# Patient Record
Sex: FEMALE | Race: BLACK OR AFRICAN AMERICAN | NOT HISPANIC OR LATINO | Employment: UNEMPLOYED | URBAN - METROPOLITAN AREA
[De-identification: names, ages, dates, MRNs, and addresses within clinical notes are randomized per-mention and may not be internally consistent; named-entity substitution may affect disease eponyms.]

---

## 2023-08-28 ENCOUNTER — OFFICE VISIT (OUTPATIENT)
Dept: PRIMARY CARE | Facility: CLINIC | Age: 43
End: 2023-08-28
Payer: COMMERCIAL

## 2023-08-28 VITALS
OXYGEN SATURATION: 98 % | SYSTOLIC BLOOD PRESSURE: 178 MMHG | HEIGHT: 66 IN | RESPIRATION RATE: 17 BRPM | WEIGHT: 229 LBS | HEART RATE: 100 BPM | BODY MASS INDEX: 36.8 KG/M2 | TEMPERATURE: 98.4 F | DIASTOLIC BLOOD PRESSURE: 103 MMHG

## 2023-08-28 DIAGNOSIS — Z00.00 HEALTH CARE MAINTENANCE: ICD-10-CM

## 2023-08-28 DIAGNOSIS — I10 UNCONTROLLED HYPERTENSION: Primary | ICD-10-CM

## 2023-08-28 DIAGNOSIS — G47.33 OBSTRUCTIVE SLEEP APNEA: ICD-10-CM

## 2023-08-28 DIAGNOSIS — Z83.3 FAMILY HISTORY OF DIABETES MELLITUS: ICD-10-CM

## 2023-08-28 PROCEDURE — 3077F SYST BP >= 140 MM HG: CPT | Performed by: STUDENT IN AN ORGANIZED HEALTH CARE EDUCATION/TRAINING PROGRAM

## 2023-08-28 PROCEDURE — 99204 OFFICE O/P NEW MOD 45 MIN: CPT | Performed by: STUDENT IN AN ORGANIZED HEALTH CARE EDUCATION/TRAINING PROGRAM

## 2023-08-28 PROCEDURE — 3080F DIAST BP >= 90 MM HG: CPT | Performed by: STUDENT IN AN ORGANIZED HEALTH CARE EDUCATION/TRAINING PROGRAM

## 2023-08-28 PROCEDURE — 93000 ELECTROCARDIOGRAM COMPLETE: CPT | Performed by: STUDENT IN AN ORGANIZED HEALTH CARE EDUCATION/TRAINING PROGRAM

## 2023-08-28 RX ORDER — LOSARTAN POTASSIUM 50 MG/1
100 TABLET ORAL DAILY
Qty: 60 TABLET | Refills: 0 | Status: SHIPPED | OUTPATIENT
Start: 2023-08-28 | End: 2023-11-13 | Stop reason: WASHOUT

## 2023-08-28 ASSESSMENT — ENCOUNTER SYMPTOMS
VOMITING: 0
HYPERTENSION: 1
WHEEZING: 0
WEAKNESS: 0
FEVER: 0
NAUSEA: 0
DEPRESSION: 0
DYSURIA: 0
NUMBNESS: 0
SPEECH DIFFICULTY: 0
COUGH: 0
SHORTNESS OF BREATH: 0
HEMATURIA: 0
DIZZINESS: 0
CONSTIPATION: 0
ABDOMINAL PAIN: 0
ACTIVITY CHANGE: 0

## 2023-08-28 NOTE — PROGRESS NOTES
Subjective   Patient ID: Jennifer Pete is a 43 y.o. female who presents for Establish Care and Hypertension.  Hypertension  Pertinent negatives include no chest pain or shortness of breath.     Ms. Pete is 43 years old here to establish care.  She has a medical history for hypertension for which she has been on hydrochlorothiazide 25 mg.  Reports to be compliant with medication  Her blood pressure was elevated up to 180s systolic on vital intake.  Repeat blood pressure was 178/103.  She denies any symptoms pertaining to cardiovascular system including headache, nausea, chest pain, dizziness, vision changes, shortness of breath, focal neurological deficit.    Past Medical History:   Diagnosis Date    34 weeks gestation of pregnancy 2022    34 weeks gestation of pregnancy    Causalgia of right upper limb 2021    Causalgia of right upper extremity    Other conditions influencing health status 05/10/2021    CRPS (complex regional pain syndrome)    Pain disorder with related psychological factors 10/30/2019    Pain disorder associated with psychological and physical factors    Pain in unspecified hand 10/01/2020    Hand pain    Personal history of other complications of pregnancy, childbirth and the puerperium 2016    History of spontaneous     Spontaneous rupture of extensor tendons, left hand 2021    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   No family history on file.   Allergies   Allergen Reactions    Iodinated Contrast Media Other    Lisinopril Cough, Swelling and Other    Other Itching and Other     Codiene    Shellfish Derived Other    Codeine Itching, Rash and Other          Occupation:     Review of Systems   Constitutional:  Negative for activity change and fever.   HENT:  Negative for congestion.    Respiratory:  Negative for cough, shortness of breath and wheezing.    Cardiovascular:  Negative  "for chest pain and leg swelling.   Gastrointestinal:  Negative for abdominal pain, constipation, nausea and vomiting.   Endocrine: Negative for cold intolerance.   Genitourinary:  Negative for dysuria, hematuria and urgency.   Neurological:  Negative for dizziness, speech difficulty, weakness and numbness.   Psychiatric/Behavioral:  Negative for self-injury and suicidal ideas.        Objective   Visit Vitals  BP (!) 178/103   Pulse 100   Temp 36.9 °C (98.4 °F)   Resp 17   Ht 1.676 m (5' 6\")   Wt 104 kg (229 lb)   SpO2 98%   BMI 36.96 kg/m²   BSA 2.2 m²      Physical Exam  HENT:      Head: Normocephalic and atraumatic.      Right Ear: Tympanic membrane normal.      Left Ear: Tympanic membrane normal.      Nose: Nose normal.      Mouth/Throat:      Mouth: Mucous membranes are moist.   Eyes:      Extraocular Movements: Extraocular movements intact.      Conjunctiva/sclera: Conjunctivae normal.      Pupils: Pupils are equal, round, and reactive to light.   Cardiovascular:      Rate and Rhythm: Normal rate and regular rhythm.      Pulses: Normal pulses.      Heart sounds: Normal heart sounds.   Pulmonary:      Effort: Pulmonary effort is normal.      Breath sounds: Normal breath sounds. No stridor. No rhonchi.   Musculoskeletal:      Cervical back: Neck supple.   Neurological:      General: No focal deficit present.      Mental Status: She is oriented to person, place, and time.      Cranial Nerves: No cranial nerve deficit.      Sensory: No sensory deficit.      Motor: No weakness.      Coordination: Coordination normal.      Gait: Gait normal.      Deep Tendon Reflexes: Reflexes normal.   Psychiatric:         Mood and Affect: Mood normal.         Behavior: Behavior normal.         Assessment/Plan     Problem List Items Addressed This Visit    None  Visit Diagnoses       Uncontrolled hypertension    -  Primary    Relevant Medications    losartan (Cozaar) 50 mg tablet    Other Relevant Orders    Lipid Panel    " Comprehensive Metabolic Panel    CBC    TSH with reflex to Free T4 if abnormal    ECG 12 lead (Completed)    Family history of diabetes mellitus        Relevant Orders    Hemoglobin A1C    Health care maintenance        Relevant Orders    Hemoglobin A1C    Obstructive sleep apnea        Relevant Orders    In-Center Sleep Study (Non-Sleep Provider Only)    Referral to Adult Sleep Medicine        Uncontrolled hypertension  In office EKG shows normal sinus rhythm with sinus tachycardia and no acute ST-T wave changes.  Patient denies any symptoms pertaining to cardiovascular system.  Neurological examination is within normal limits  Patient has a history of diagnosed sleep apnea 10 to 15 years ago, was given his CPAP but reports to be noncompliant  Repeat testing along with sleep medicine referral placed  We will get some blood work including A1c and lipid panel given the family history of diabetes and elevated cholesterol.  Once results are obtained we will call the patient with abnormal results of any and discuss further evaluation and management.  Advised patient to continue hydrochlorothiazide 25 mg daily along with losartan 100 mg  Patient to seek medical attention immediately or call 911 if she develops alarm symptoms and signs including chest pain shortness of breath or focal neurological deficit.  Verbalizes understanding.  Patient to see me in 3 to 4 days for a brief blood pressure check.

## 2023-10-06 ENCOUNTER — HOSPITAL ENCOUNTER (EMERGENCY)
Facility: HOSPITAL | Age: 43
Discharge: HOME | End: 2023-10-06
Payer: COMMERCIAL

## 2023-10-06 VITALS
DIASTOLIC BLOOD PRESSURE: 99 MMHG | SYSTOLIC BLOOD PRESSURE: 180 MMHG | RESPIRATION RATE: 16 BRPM | HEART RATE: 102 BPM | HEIGHT: 66 IN | OXYGEN SATURATION: 99 % | TEMPERATURE: 97.7 F | BODY MASS INDEX: 34.72 KG/M2 | WEIGHT: 216 LBS

## 2023-10-06 DIAGNOSIS — T63.444A BEE STING REACTION, UNDETERMINED INTENT, INITIAL ENCOUNTER: Primary | ICD-10-CM

## 2023-10-06 PROCEDURE — 99283 EMERGENCY DEPT VISIT LOW MDM: CPT

## 2023-10-06 PROCEDURE — 99283 EMERGENCY DEPT VISIT LOW MDM: CPT | Performed by: PHYSICIAN ASSISTANT

## 2023-10-06 RX ORDER — CAMPHOR 0.45 %
1 GEL (GRAM) TOPICAL 3 TIMES DAILY
Qty: 210 G | Refills: 0 | Status: SHIPPED | OUTPATIENT
Start: 2023-10-06 | End: 2023-11-13 | Stop reason: WASHOUT

## 2023-10-06 RX ORDER — DIPHENHYDRAMINE HCL 25 MG
50 TABLET ORAL EVERY 6 HOURS
Qty: 56 TABLET | Refills: 0 | Status: SHIPPED | OUTPATIENT
Start: 2023-10-06 | End: 2023-11-13 | Stop reason: WASHOUT

## 2023-10-06 RX ORDER — LORATADINE 10 MG/1
10 TABLET ORAL DAILY
Qty: 20 TABLET | Refills: 0 | Status: SHIPPED | OUTPATIENT
Start: 2023-10-06 | End: 2023-10-26

## 2023-10-06 ASSESSMENT — COLUMBIA-SUICIDE SEVERITY RATING SCALE - C-SSRS
1. IN THE PAST MONTH, HAVE YOU WISHED YOU WERE DEAD OR WISHED YOU COULD GO TO SLEEP AND NOT WAKE UP?: NO
6. HAVE YOU EVER DONE ANYTHING, STARTED TO DO ANYTHING, OR PREPARED TO DO ANYTHING TO END YOUR LIFE?: NO

## 2023-10-06 NOTE — ED PROVIDER NOTES
"This is a 43-year-old female with past medical history of hypertension who presents to the ED after a bee sting to her right hand that occurred 2 days ago.  She states initially she was concerned the stinger was stuck in her hand so her significant other tried to pull it out with some tweezers, however was unsure if they were able to remove the stinger and she now has a very small wound present scab present to her hand where they tried to remove the stinger from.  She states that she has noticed it has been red and slightly swollen ever since the bee sting and was concerned that this was not improving.  She states the area is mildly pruritic.  She denies any rash although where anywhere else on her body.  She denies any lip or tongue swelling, sensation that her throat was closing, difficulty swallowing, shortness of breath, lightheadedness, palpitations or other symptoms at this time.  She states that she has not been stung by a bee in several years and does not remember her previous reaction.  She denies any history of anaphylactic reactions to bee stings.  She took ibuprofen at home for her symptoms.  She did not take any antihistamines.      History provided by:  Patient   used: No          Visit Vitals  BP (!) 180/99 (BP Location: Right arm)   Pulse 102   Temp 36.5 °C (97.7 °F)   Resp 16   Ht 1.676 m (5' 6\")   Wt 98 kg (216 lb)   SpO2 99%   BMI 34.86 kg/m²   BSA 2.14 m²          Physical Exam     Physical exam:   General: Vitals noted, no distress. Afebrile.   EENT: Hearing grossly intact. Normal phonation.  Posterior oropharynx gross unremarkable.any visible angioedema.  No angioedema to lips or tongue.  MMM. Airway patient. PERRL. EOMI. Neck: No midline tenderness or paraspinal tenderness. FROM.   Cardiac: Regular, rate, rhythm. Normal S1 and S2.  No murmurs, gallops, rubs.   Pulmonary: Good air exchange. Lungs clear bilaterally. No wheezes, rhonchi, rales. No accessory muscle use.  "   Back: No CVA tenderness. No midline tenderness or paraspinal tenderness. No obvious deformity.   Extremities: No peripheral edema.  Full range of motion. Moves all extremities freely.  Tenderness palpation to the right hand without any other areas of tenderness.    Skin: Small amount of soft tissue swelling with tenderness palpation over the dorsum of the right hand with a small scab present without any visible foreign bodies.  Mildly tender to touch.  No excess warmth to this area, however small amount of erythema.  No hives.  Warm and Dry.   Neuro: No focal neurologic deficits. CN 2-12 grossly intact. Sensation equal bilaterally. No weakness.         Labs Reviewed - No data to display    No orders to display         ED Course & MDM     Medical Decision Making  This is a 43-year-old female with past medical Struve hypertension who presents to the ED after she was stung by a bee 2 days ago with redness and swelling to her right hand.  Vital stable upon arrival to the ED vitals that show she was hypertensive at 180/99 upon arrival to the ED, however patient denied headache, chest pain or other symptoms of hypertensive emergency.  Patient was advised to call the primary care provider concerning this blood pressure and to continue taking her home blood pressure medications.  On examination of her hand there was a small scab present with surrounding erythema without any excess warmth.  The area was mildly tender to touch.  Exam is consistent with a localized histamine reaction.  Patient denied any systemic symptoms of infection and based on her description of her symptoms I have low suspicion for cellulitis at this time.  Patient was advised to take antihistamines at home as needed for this and was given prescriptions for Claritin, Benadryl, and topical Benadryl cream for her symptoms.  She was advised to follow with her primary care provider if her symptoms persist.  She was given signs symptoms to return to the ED  with and was discharged from emergency department stable condition.    Risk  Prescription drug management.           Procedures    TOM Alfredo, PAT Atkins PA-C  10/06/23 1204

## 2023-11-09 PROBLEM — J45.909 ASTHMA (HHS-HCC): Status: ACTIVE | Noted: 2019-10-25

## 2023-11-09 PROBLEM — F41.9 ANXIETY: Status: ACTIVE | Noted: 2023-11-09

## 2023-11-09 PROBLEM — G90.511 COMPLEX REGIONAL PAIN SYNDROME TYPE 1 OF RIGHT UPPER EXTREMITY: Status: ACTIVE | Noted: 2019-09-25

## 2023-11-09 PROBLEM — N92.1 MENOMETRORRHAGIA: Status: ACTIVE | Noted: 2020-04-02

## 2023-11-09 PROBLEM — D64.9 ANEMIA: Status: ACTIVE | Noted: 2023-11-09

## 2023-11-09 PROBLEM — N93.9 ABNORMAL UTERINE BLEEDING (AUB): Status: ACTIVE | Noted: 2020-09-28

## 2023-11-09 PROBLEM — O09.90 SUPERVISION OF HIGH-RISK PREGNANCY (HHS-HCC): Status: ACTIVE | Noted: 2023-11-09

## 2023-11-09 PROBLEM — I10 ESSENTIAL HYPERTENSION: Status: ACTIVE | Noted: 2019-10-25

## 2023-11-09 PROBLEM — E61.1 IRON DEFICIENCY: Status: ACTIVE | Noted: 2022-02-22

## 2023-11-09 PROBLEM — G47.30 SLEEP APNEA: Status: ACTIVE | Noted: 2023-11-09

## 2023-11-09 RX ORDER — CYANOCOBALAMIN (VITAMIN B-12) 1000MCG/15
1000 LIQUID (ML) ORAL
COMMUNITY
Start: 2022-04-01 | End: 2023-11-13 | Stop reason: WASHOUT

## 2023-11-09 RX ORDER — NAPROXEN SODIUM 220 MG/1
1 TABLET, FILM COATED ORAL DAILY
COMMUNITY
Start: 2022-03-31

## 2023-11-09 RX ORDER — MEDROXYPROGESTERONE ACETATE 150 MG/ML
INJECTION, SUSPENSION INTRAMUSCULAR
COMMUNITY
Start: 2022-06-14

## 2023-11-09 RX ORDER — FLUTICASONE FUROATE AND VILANTEROL 100; 25 UG/1; UG/1
1 POWDER RESPIRATORY (INHALATION)
COMMUNITY
Start: 2020-03-26

## 2023-11-09 RX ORDER — CYCLOBENZAPRINE HCL 5 MG
1 TABLET ORAL NIGHTLY
COMMUNITY
Start: 2022-08-05 | End: 2023-11-13 | Stop reason: WASHOUT

## 2023-11-09 RX ORDER — MONTELUKAST SODIUM 10 MG/1
10 TABLET ORAL
COMMUNITY
Start: 2022-02-02 | End: 2023-11-13 | Stop reason: WASHOUT

## 2023-11-09 RX ORDER — OMEPRAZOLE 20 MG/1
20 CAPSULE, DELAYED RELEASE ORAL
COMMUNITY
Start: 2022-02-18 | End: 2023-11-13 | Stop reason: WASHOUT

## 2023-11-09 RX ORDER — HYDROCHLOROTHIAZIDE 25 MG/1
1 TABLET ORAL DAILY
COMMUNITY
Start: 2022-08-05

## 2023-11-09 RX ORDER — ALBUTEROL SULFATE 90 UG/1
2 AEROSOL, METERED RESPIRATORY (INHALATION)
COMMUNITY
Start: 2022-03-31

## 2023-11-09 RX ORDER — AMLODIPINE BESYLATE 10 MG/1
TABLET ORAL
COMMUNITY
Start: 2022-06-14

## 2023-11-09 RX ORDER — ACETAMINOPHEN 325 MG/1
TABLET ORAL
COMMUNITY
Start: 2022-06-14 | End: 2023-11-13 | Stop reason: WASHOUT

## 2023-11-09 RX ORDER — TRIAMTERENE AND HYDROCHLOROTHIAZIDE 37.5; 25 MG/1; MG/1
1 CAPSULE ORAL
COMMUNITY
End: 2023-11-13 | Stop reason: WASHOUT

## 2023-11-09 RX ORDER — CETIRIZINE HYDROCHLORIDE 10 MG/1
1 TABLET ORAL NIGHTLY
COMMUNITY
Start: 2022-02-02

## 2023-11-09 RX ORDER — FERROUS SULFATE 325(65) MG
1 TABLET ORAL 2 TIMES DAILY
COMMUNITY
Start: 2022-03-31 | End: 2023-11-13 | Stop reason: WASHOUT

## 2023-11-09 RX ORDER — LABETALOL 200 MG/1
400 TABLET, FILM COATED ORAL 2 TIMES DAILY
COMMUNITY
Start: 2022-02-21 | End: 2023-11-13 | Stop reason: WASHOUT

## 2023-11-13 ENCOUNTER — CLINICAL SUPPORT (OUTPATIENT)
Dept: OBSTETRICS AND GYNECOLOGY | Facility: CLINIC | Age: 43
End: 2023-11-13
Payer: COMMERCIAL

## 2023-11-13 DIAGNOSIS — Z30.42 ENCOUNTER FOR DEPO-PROVERA CONTRACEPTION: Primary | ICD-10-CM

## 2023-11-13 PROCEDURE — 2500000004 HC RX 250 GENERAL PHARMACY W/ HCPCS (ALT 636 FOR OP/ED): Mod: SE | Performed by: STUDENT IN AN ORGANIZED HEALTH CARE EDUCATION/TRAINING PROGRAM

## 2023-11-13 PROCEDURE — 96372 THER/PROPH/DIAG INJ SC/IM: CPT | Performed by: STUDENT IN AN ORGANIZED HEALTH CARE EDUCATION/TRAINING PROGRAM

## 2023-11-13 RX ORDER — MEDROXYPROGESTERONE ACETATE 150 MG/ML
150 INJECTION, SUSPENSION INTRAMUSCULAR ONCE
Status: COMPLETED | OUTPATIENT
Start: 2023-11-13 | End: 2023-11-13

## 2023-11-13 RX ADMIN — MEDROXYPROGESTERONE ACETATE 150 MG: 150 INJECTION, SUSPENSION INTRAMUSCULAR at 11:47

## 2023-11-13 ASSESSMENT — PATIENT HEALTH QUESTIONNAIRE - PHQ9
2. FEELING DOWN, DEPRESSED OR HOPELESS: NOT AT ALL
1. LITTLE INTEREST OR PLEASURE IN DOING THINGS: NOT AT ALL
SUM OF ALL RESPONSES TO PHQ9 QUESTIONS 1 & 2: 0

## 2023-11-13 NOTE — PROGRESS NOTES
Patient here for Depoprovera, last injection 8/18/2023  Discussed side effects ( denies ), calcium- will take supplements.    Return to clinic card given for   1/29-2/19/2024    Annual exam due 8/2024    Depoprovera 150mg intramuscular given.  Tolerated well

## 2024-02-02 ENCOUNTER — CLINICAL SUPPORT (OUTPATIENT)
Dept: OBSTETRICS AND GYNECOLOGY | Facility: CLINIC | Age: 44
End: 2024-02-02
Payer: COMMERCIAL

## 2024-02-02 DIAGNOSIS — Z30.42 ENCOUNTER FOR DEPO-PROVERA CONTRACEPTION: Primary | ICD-10-CM

## 2024-02-02 PROCEDURE — 2500000004 HC RX 250 GENERAL PHARMACY W/ HCPCS (ALT 636 FOR OP/ED): Mod: SE | Performed by: OBSTETRICS & GYNECOLOGY

## 2024-02-02 PROCEDURE — 96372 THER/PROPH/DIAG INJ SC/IM: CPT | Performed by: OBSTETRICS & GYNECOLOGY

## 2024-02-02 RX ORDER — MEDROXYPROGESTERONE ACETATE 150 MG/ML
150 INJECTION, SUSPENSION INTRAMUSCULAR ONCE
Status: COMPLETED | OUTPATIENT
Start: 2024-02-02 | End: 2024-02-02

## 2024-02-02 RX ADMIN — MEDROXYPROGESTERONE ACETATE 150 MG: 150 INJECTION, SUSPENSION INTRAMUSCULAR at 14:11

## 2024-02-02 ASSESSMENT — PATIENT HEALTH QUESTIONNAIRE - PHQ9
SUM OF ALL RESPONSES TO PHQ9 QUESTIONS 1 & 2: 0
1. LITTLE INTEREST OR PLEASURE IN DOING THINGS: NOT AT ALL
2. FEELING DOWN, DEPRESSED OR HOPELESS: NOT AT ALL

## 2024-02-02 ASSESSMENT — SOCIAL DETERMINANTS OF HEALTH (SDOH)
WITHIN THE LAST YEAR, HAVE YOU BEEN KICKED, HIT, SLAPPED, OR OTHERWISE PHYSICALLY HURT BY YOUR PARTNER OR EX-PARTNER?: NO
WITHIN THE LAST YEAR, HAVE YOU BEEN AFRAID OF YOUR PARTNER OR EX-PARTNER?: NO
WITHIN THE LAST YEAR, HAVE YOU BEEN HUMILIATED OR EMOTIONALLY ABUSED IN OTHER WAYS BY YOUR PARTNER OR EX-PARTNER?: NO
WITHIN THE LAST YEAR, HAVE TO BEEN RAPED OR FORCED TO HAVE ANY KIND OF SEXUAL ACTIVITY BY YOUR PARTNER OR EX-PARTNER?: NO

## 2024-02-02 NOTE — PROGRESS NOTES
Patient here for Depoprovera, last injection 11/13/23  Discussed side effects (  denies), calcium.    Return to clinic card given for   4/19-5/10/2024    Annual exam due 8/2024    Depoprovera 150mg intramuscular given.  Tolerated well

## 2024-04-19 ENCOUNTER — CLINICAL SUPPORT (OUTPATIENT)
Dept: OBSTETRICS AND GYNECOLOGY | Facility: CLINIC | Age: 44
End: 2024-04-19
Payer: COMMERCIAL

## 2024-04-19 DIAGNOSIS — Z30.42 DEPO-PROVERA CONTRACEPTIVE STATUS: Primary | ICD-10-CM

## 2024-04-19 PROCEDURE — 2500000004 HC RX 250 GENERAL PHARMACY W/ HCPCS (ALT 636 FOR OP/ED): Mod: SE | Performed by: ADVANCED PRACTICE MIDWIFE

## 2024-04-19 PROCEDURE — 96372 THER/PROPH/DIAG INJ SC/IM: CPT | Performed by: ADVANCED PRACTICE MIDWIFE

## 2024-04-19 RX ORDER — MEDROXYPROGESTERONE ACETATE 150 MG/ML
150 INJECTION, SUSPENSION INTRAMUSCULAR ONCE
Status: COMPLETED | OUTPATIENT
Start: 2024-04-19 | End: 2024-04-19

## 2024-04-19 RX ADMIN — MEDROXYPROGESTERONE ACETATE 150 MG: 150 INJECTION, SUSPENSION INTRAMUSCULAR at 09:48

## 2024-04-19 NOTE — PROGRESS NOTES
Pt happy with depo. Discussed increasing calcium in diet, Next ape due in August. Next depo due 7/5-7/26/24

## 2024-08-07 ENCOUNTER — OFFICE VISIT (OUTPATIENT)
Dept: OBSTETRICS AND GYNECOLOGY | Facility: CLINIC | Age: 44
End: 2024-08-07
Payer: COMMERCIAL

## 2024-08-07 ENCOUNTER — PHARMACY VISIT (OUTPATIENT)
Dept: PHARMACY | Facility: CLINIC | Age: 44
End: 2024-08-07
Payer: MEDICAID

## 2024-08-07 VITALS
BODY MASS INDEX: 38.17 KG/M2 | SYSTOLIC BLOOD PRESSURE: 162 MMHG | HEIGHT: 66 IN | WEIGHT: 237.5 LBS | DIASTOLIC BLOOD PRESSURE: 115 MMHG

## 2024-08-07 DIAGNOSIS — I10 UNCONTROLLED HYPERTENSION: ICD-10-CM

## 2024-08-07 DIAGNOSIS — Z01.419 VISIT FOR GYNECOLOGIC EXAMINATION: Primary | ICD-10-CM

## 2024-08-07 DIAGNOSIS — L02.92 BOIL: ICD-10-CM

## 2024-08-07 DIAGNOSIS — F43.9 STRESS AT HOME: ICD-10-CM

## 2024-08-07 DIAGNOSIS — Z32.02 PREGNANCY TEST NEGATIVE: ICD-10-CM

## 2024-08-07 DIAGNOSIS — F41.9 ANXIETY: ICD-10-CM

## 2024-08-07 DIAGNOSIS — Z30.013 ENCOUNTER FOR INITIAL PRESCRIPTION OF INJECTABLE CONTRACEPTIVE: ICD-10-CM

## 2024-08-07 LAB — PREGNANCY TEST URINE, POC: NEGATIVE

## 2024-08-07 PROCEDURE — 87491 CHLMYD TRACH DNA AMP PROBE: CPT | Performed by: ADVANCED PRACTICE MIDWIFE

## 2024-08-07 PROCEDURE — 99396 PREV VISIT EST AGE 40-64: CPT | Performed by: ADVANCED PRACTICE MIDWIFE

## 2024-08-07 PROCEDURE — 81025 URINE PREGNANCY TEST: CPT | Performed by: ADVANCED PRACTICE MIDWIFE

## 2024-08-07 PROCEDURE — 96372 THER/PROPH/DIAG INJ SC/IM: CPT | Performed by: ADVANCED PRACTICE MIDWIFE

## 2024-08-07 PROCEDURE — 3077F SYST BP >= 140 MM HG: CPT | Performed by: ADVANCED PRACTICE MIDWIFE

## 2024-08-07 PROCEDURE — 3080F DIAST BP >= 90 MM HG: CPT | Performed by: ADVANCED PRACTICE MIDWIFE

## 2024-08-07 PROCEDURE — 3008F BODY MASS INDEX DOCD: CPT | Performed by: ADVANCED PRACTICE MIDWIFE

## 2024-08-07 PROCEDURE — RXMED WILLOW AMBULATORY MEDICATION CHARGE

## 2024-08-07 PROCEDURE — 2500000004 HC RX 250 GENERAL PHARMACY W/ HCPCS (ALT 636 FOR OP/ED): Mod: SE | Performed by: ADVANCED PRACTICE MIDWIFE

## 2024-08-07 PROCEDURE — 87661 TRICHOMONAS VAGINALIS AMPLIF: CPT | Performed by: ADVANCED PRACTICE MIDWIFE

## 2024-08-07 RX ORDER — NAPROXEN SODIUM 220 MG/1
81 TABLET, FILM COATED ORAL DAILY
Qty: 90 TABLET | Refills: 0 | Status: SHIPPED | OUTPATIENT
Start: 2024-08-07

## 2024-08-07 RX ORDER — MEDROXYPROGESTERONE ACETATE 150 MG/ML
150 INJECTION, SUSPENSION INTRAMUSCULAR
Status: SHIPPED | OUTPATIENT
Start: 2024-08-07

## 2024-08-07 RX ORDER — AMLODIPINE BESYLATE 10 MG/1
10 TABLET ORAL DAILY
Qty: 30 TABLET | Refills: 2 | Status: SHIPPED | OUTPATIENT
Start: 2024-08-07

## 2024-08-07 RX ORDER — HYDROCHLOROTHIAZIDE 25 MG/1
25 TABLET ORAL DAILY
Qty: 60 TABLET | Refills: 2 | Status: SHIPPED | OUTPATIENT
Start: 2024-08-07

## 2024-08-07 RX ORDER — SULFAMETHOXAZOLE AND TRIMETHOPRIM 800; 160 MG/1; MG/1
1 TABLET ORAL 2 TIMES DAILY
Qty: 6 TABLET | Refills: 0 | Status: SHIPPED | OUTPATIENT
Start: 2024-08-07 | End: 2024-08-10

## 2024-08-07 ASSESSMENT — ENCOUNTER SYMPTOMS
ALLERGIC/IMMUNOLOGIC NEGATIVE: 0
ENDOCRINE NEGATIVE: 0
RESPIRATORY NEGATIVE: 1
PSYCHIATRIC NEGATIVE: 0
RESPIRATORY NEGATIVE: 0
GASTROINTESTINAL NEGATIVE: 1
ALLERGIC/IMMUNOLOGIC NEGATIVE: 1
ENDOCRINE NEGATIVE: 1
NEUROLOGICAL NEGATIVE: 0
HEMATOLOGIC/LYMPHATIC NEGATIVE: 1
FATIGUE: 1
CARDIOVASCULAR NEGATIVE: 1
EYES NEGATIVE: 0
CARDIOVASCULAR NEGATIVE: 0
MUSCULOSKELETAL NEGATIVE: 0
PSYCHIATRIC NEGATIVE: 1
NEUROLOGICAL NEGATIVE: 1
EYES NEGATIVE: 1
CONSTITUTIONAL NEGATIVE: 0
MUSCULOSKELETAL NEGATIVE: 1
HEMATOLOGIC/LYMPHATIC NEGATIVE: 0
GASTROINTESTINAL NEGATIVE: 0

## 2024-08-07 ASSESSMENT — PATIENT HEALTH QUESTIONNAIRE - PHQ9
1. LITTLE INTEREST OR PLEASURE IN DOING THINGS: NOT AT ALL
SUM OF ALL RESPONSES TO PHQ9 QUESTIONS 1 AND 2: 0
2. FEELING DOWN, DEPRESSED OR HOPELESS: NOT AT ALL

## 2024-08-07 ASSESSMENT — PAIN SCALES - GENERAL: PAINLEVEL: 0-NO PAIN

## 2024-08-07 NOTE — PROGRESS NOTES
"Balwinder Pete is a 44 y.o. female who is here for a routine exam. none  Last depo April   Was due end of July    Current contraception: Depo-Provera injections  History of abnormal Pap smear: no  Family history of uterine or ovarian cancer: no  Regular self breast exam: yes  History of abnormal mammogram: no  Family history of breast cancer: no  History of abnormal lipids: no  Menstrual History:  OB History          4    Para   1    Term   1            AB   2    Living   2         SAB   1    IAB   1    Ectopic        Multiple        Live Births   1                Menarche age: 14  No LMP recorded. Patient has had an injection.       Review of Systems   Constitutional:  Positive for fatigue.   HENT: Negative.     Eyes: Negative.    Respiratory: Negative.     Cardiovascular: Negative.    Gastrointestinal: Negative.    Endocrine: Negative.    Genitourinary: Negative.    Musculoskeletal: Negative.    Skin: Negative.    Allergic/Immunologic: Negative.    Neurological: Negative.    Hematological: Negative.    Psychiatric/Behavioral: Negative.         Objective   BP (!) 162/115 Comment: MD aware  Ht 1.676 m (5' 6\")   Wt 108 kg (237 lb 8 oz)   BMI 38.33 kg/m²   Physical Exam  Vitals reviewed.   Constitutional:       General: She is not in acute distress.     Appearance: Normal appearance.   HENT:      Head: Normocephalic.   Eyes:      Pupils: Pupils are equal, round, and reactive to light.   Cardiovascular:      Rate and Rhythm: Normal rate and regular rhythm.      Heart sounds: No murmur heard.     No gallop.   Pulmonary:      Effort: Pulmonary effort is normal. No respiratory distress.      Breath sounds: Normal breath sounds. No stridor. No wheezing, rhonchi or rales.   Chest:      Chest wall: No tenderness or crepitus.   Breasts:     Right: No inverted nipple, mass, nipple discharge, skin change or tenderness.      Left: Normal. No inverted nipple, mass, nipple discharge, skin change or " tenderness.   Abdominal:      General: Abdomen is flat.      Palpations: Abdomen is soft. There is no mass.      Tenderness: There is no abdominal tenderness. There is no right CVA tenderness, left CVA tenderness or rebound.      Hernia: No hernia is present.   Genitourinary:     General: Normal vulva.      Pubic Area: No rash.       Labia:         Right: No rash, tenderness, lesion or injury.         Left: No rash, tenderness, lesion or injury.       Urethra: No prolapse or urethral swelling.      Vagina: Normal. No foreign body. No erythema, tenderness, bleeding, lesions or prolapsed vaginal walls.      Cervix: No cervical motion tenderness, friability or lesion.      Uterus: Normal. Not enlarged, not tender and no uterine prolapse.       Adnexa: Right adnexa normal and left adnexa normal.        Right: No mass or tenderness.          Left: No mass or tenderness.        Rectum: Normal.      Comments: EGBUS WNL   Musculoskeletal:      Cervical back: Neck supple. No rigidity or tenderness.   Skin:     General: Skin is warm and dry.   Neurological:      Mental Status: She is alert.   Psychiatric:         Mood and Affect: Mood normal.         Behavior: Behavior normal.         Thought Content: Thought content normal.         Judgment: Judgment normal.          Assessment/Plan   Problem List Items Addressed This Visit    None  Visit Diagnoses       Visit for gynecologic examination    -  Primary    Relevant Orders    CBC    THINPREP PAP TEST    Hemoglobin A1c    TSH with reflex to Free T4 if abnormal    BI mammo bilateral screening tomosynthesis    Pregnancy test negative        Relevant Orders    POCT pregnancy, urine manually resulted (Completed)    Uncontrolled hypertension        Relevant Medications    aspirin 81 mg chewable tablet    hydroCHLOROthiazide (HYDRODiuril) 25 mg tablet    amLODIPine (Norvasc) 10 mg tablet    Other Relevant Orders    Referral to Primary Care    Comprehensive Metabolic Panel    Encounter  for initial prescription of injectable contraceptive        Relevant Orders    Hepatitis C Antibody    HIV-1 and HIV-2 antibodies    Syphilis Screen with Reflex           Lengthy discussion with patient regarding severe range BP  Recheck was 181/125 as patient became more stressed thinking about complex social situation- recommended patient go to ED via ambulance immediately- patient declines    Risks of stroke, paralysis, morbidity and mortality reviewed    Patient is to follow up with new PCP ASAP recs given  Rx for amlodipine added -side effects reviewed      Radha ALMEIDA CNM

## 2024-08-08 ENCOUNTER — APPOINTMENT (OUTPATIENT)
Dept: OBSTETRICS AND GYNECOLOGY | Facility: CLINIC | Age: 44
End: 2024-08-08
Payer: COMMERCIAL

## 2024-08-08 LAB
C TRACH RRNA SPEC QL NAA+PROBE: NEGATIVE
N GONORRHOEA DNA SPEC QL PROBE+SIG AMP: NEGATIVE
T VAGINALIS RRNA SPEC QL NAA+PROBE: NEGATIVE

## 2024-08-22 LAB
CYTOLOGY CMNT CVX/VAG CYTO-IMP: NORMAL
HPV HR 12 DNA GENITAL QL NAA+PROBE: NEGATIVE
HPV HR GENOTYPES PNL CVX NAA+PROBE: NEGATIVE
HPV16 DNA SPEC QL NAA+PROBE: NEGATIVE
HPV18 DNA SPEC QL NAA+PROBE: NEGATIVE
LAB AP HPV GENOTYPE QUESTION: YES
LAB AP HPV HR: NORMAL
LAB AP PAP ADDITIONAL TESTS: NORMAL
LABORATORY COMMENT REPORT: NORMAL
MENSTRUAL HX REPORTED: NORMAL
PATH REPORT.TOTAL CANCER: NORMAL

## 2024-09-11 ENCOUNTER — PHARMACY VISIT (OUTPATIENT)
Dept: PHARMACY | Facility: CLINIC | Age: 44
End: 2024-09-11
Payer: MEDICAID

## 2024-09-11 PROCEDURE — RXMED WILLOW AMBULATORY MEDICATION CHARGE

## 2024-10-23 ENCOUNTER — CLINICAL SUPPORT (OUTPATIENT)
Dept: OBSTETRICS AND GYNECOLOGY | Facility: CLINIC | Age: 44
End: 2024-10-23
Payer: COMMERCIAL

## 2024-10-23 PROCEDURE — 2500000004 HC RX 250 GENERAL PHARMACY W/ HCPCS (ALT 636 FOR OP/ED): Mod: SE | Performed by: ADVANCED PRACTICE MIDWIFE

## 2024-10-23 PROCEDURE — 96372 THER/PROPH/DIAG INJ SC/IM: CPT | Performed by: ADVANCED PRACTICE MIDWIFE

## 2024-10-23 NOTE — PROGRESS NOTES
Patient here for Depo injection.  LMP: None  Last Depo: 8/7/24  Last Annual: 8/7/24  RN discussed Depo-Provera side effects calcium.  Depo given IM into Left gluteal region. Depo supplied by office depo. Patient tolerated well.  Patient to RTC between 1/8-1/22 for depo   Patient verbalized understanding and all questions were answered.

## 2024-11-18 ENCOUNTER — HOSPITAL ENCOUNTER (EMERGENCY)
Facility: HOSPITAL | Age: 44
Discharge: HOME | End: 2024-11-18
Attending: EMERGENCY MEDICINE
Payer: COMMERCIAL

## 2024-11-18 VITALS
TEMPERATURE: 97.8 F | RESPIRATION RATE: 18 BRPM | SYSTOLIC BLOOD PRESSURE: 171 MMHG | HEART RATE: 96 BPM | DIASTOLIC BLOOD PRESSURE: 111 MMHG | OXYGEN SATURATION: 96 % | WEIGHT: 237 LBS | HEIGHT: 66 IN | BODY MASS INDEX: 38.09 KG/M2

## 2024-11-18 DIAGNOSIS — T14.8XXA SUPERFICIAL FOREIGN BODY (SLIVER): Primary | ICD-10-CM

## 2024-11-18 PROCEDURE — 10120 INC&RMVL FB SUBQ TISS SMPL: CPT

## 2024-11-18 PROCEDURE — 99284 EMERGENCY DEPT VISIT MOD MDM: CPT | Performed by: EMERGENCY MEDICINE

## 2024-11-18 PROCEDURE — 28190 REMOVAL OF FOOT FOREIGN BODY: CPT | Performed by: EMERGENCY MEDICINE

## 2024-11-18 PROCEDURE — 99282 EMERGENCY DEPT VISIT SF MDM: CPT | Mod: 25

## 2024-11-18 PROCEDURE — 2500000001 HC RX 250 WO HCPCS SELF ADMINISTERED DRUGS (ALT 637 FOR MEDICARE OP): Mod: SE

## 2024-11-18 RX ORDER — ACETAMINOPHEN 325 MG/1
975 TABLET ORAL ONCE
Status: COMPLETED | OUTPATIENT
Start: 2024-11-18 | End: 2024-11-18

## 2024-11-18 RX ORDER — IBUPROFEN 600 MG/1
600 TABLET ORAL ONCE
Status: COMPLETED | OUTPATIENT
Start: 2024-11-18 | End: 2024-11-18

## 2024-11-18 ASSESSMENT — PAIN - FUNCTIONAL ASSESSMENT: PAIN_FUNCTIONAL_ASSESSMENT: 0-10

## 2024-11-18 ASSESSMENT — PAIN DESCRIPTION - ORIENTATION: ORIENTATION: LEFT

## 2024-11-18 ASSESSMENT — COLUMBIA-SUICIDE SEVERITY RATING SCALE - C-SSRS
1. IN THE PAST MONTH, HAVE YOU WISHED YOU WERE DEAD OR WISHED YOU COULD GO TO SLEEP AND NOT WAKE UP?: NO
2. HAVE YOU ACTUALLY HAD ANY THOUGHTS OF KILLING YOURSELF?: NO
6. HAVE YOU EVER DONE ANYTHING, STARTED TO DO ANYTHING, OR PREPARED TO DO ANYTHING TO END YOUR LIFE?: NO

## 2024-11-18 ASSESSMENT — PAIN SCALES - GENERAL: PAINLEVEL_OUTOF10: 10 - WORST POSSIBLE PAIN

## 2024-11-18 ASSESSMENT — PAIN DESCRIPTION - PAIN TYPE: TYPE: ACUTE PAIN

## 2024-11-18 ASSESSMENT — PAIN DESCRIPTION - LOCATION: LOCATION: FOOT

## 2024-11-18 NOTE — Clinical Note
Jennifer Pete was seen and treated in our emergency department on 11/18/2024.  She may return to work on 11/19/2024.       If you have any questions or concerns, please don't hesitate to call.      Jessika Partida, DO

## 2024-11-19 NOTE — DISCHARGE INSTRUCTIONS
You are seen for evaluation of a splinter removal, we removed the shard of glass from the bottle of your left foot.  Please look out for signs of infection including redness pus swelling fevers or worsening pain.  Please wash daily with soap and water and keep the area clean.  Please follow-up with your primary care in about a week for wound check, also please be sure to continue taking her blood pressure medicine as her blood pressure was high here. We did update tetanus

## 2024-11-19 NOTE — ED TRIAGE NOTES
"Patient comes in today stating that yesterday she was picking up broken glass on the ground, she thought she had it all taken care of it all but when she woke up today, it was painful to ambulate and she noticed there was still a piece of glass in her L heel; the area is not red or opened, but there is localized swelling; patient is unsure when her last tetanus shot was; states she has not taken her hypertension medications today, \"I was taking Motrin instead\"  "

## 2024-11-19 NOTE — ED PROVIDER NOTES
History of Present Illness     History provided by: Patient  Limitations to History: None Identified  External Records Reviewed with Brief Summary: Previous ED visits/recent PCP notes for PMH     HPI:  Jennifer Pete is a 44 y.o. female who presents for evaluation of left foot pain after stepping on broken glass yesterday.  She attempted to remove at home however was not able to get all the glass.  She is having difficulty bearing weight on her left heel.  She has not had any fevers chills no numbness tingling or weakness.  This was with a barefoot not there issue.  Unsure of last tetanus.    Physical Exam   Triage vitals:  T 36.6 °C (97.8 °F)  HR 96  BP (!) 171/111  RR 18  O2 96 % None (Room air)    General: Awake, alert, in no acute distress  Eyes: Gaze conjugate.  No scleral icterus or injection  HENT: Normo-cephalic, atraumatic. No stridor  CV: RRR. Radial/PT pulses 2+ bilaterally  Resp: Breathing non-labored, speaking in full sentences.  Clear to auscultation bilaterally  GI: Soft, non-distended, non-tender. No rebound or guarding.  : Deferred  MSK/Extremities: No gross bony deformities. Moving all extremities  Skin: Warm. Appropriate color, irregularity of left heel with pain on palpation concerning for underlying foreign body  Neuro: Alert. Oriented. Face symmetric. Speech is fluent.  Gross strength and sensation intact in b/l UE and LEs  Psych: Appropriate mood and affect      Medical Decision Making & ED Course   Medical Decision Makin y.o. female who presents for evaluation of foreign body removal of her left heel.  She had stepped on broken glass.  The area was cleaned with soapy water patient was given Tylenol and Motrin for pain control.  Discussed whether or not a block would be helpful for the patient however given location I am concerned for incomplete block or incomplete pain control and after shared decision making we will proceed without nerve block or local infiltration of  lidocaine.  Using forceps and needle  was able to grasp and remove a small shard of glass.  After removal of the glass patient was able to bear weight on the area and is no longer in any significant discomfort.  Did not have to extend the size of the wound.  Did update tetanus is unsure of her last tetanus shot.  Her vitals were stable with the exception of hypertension.  Patient states that she had not yet taken her hypertension meds today.  Encouraged her to make sure she takes these medicines as prescribed and follow-up with primary care.  Was given strict return precautions for infection and patient is agreeable with plan for home-going.  ----     Social Determinants of Health which Significantly Impact Care: None identified     Chronic conditions affecting the patient's care: As documented in the [facility]    Care Considerations: As per [facility]    ED Course:  ED Course as of 11/18/24 2223 Mon Nov 18, 2024 2221 ATTENDING ATTESTATION  Healthy female not immunocompromise stepped on a piece of glass prior to arrival successfully removed at the bedside.  Hemodynamically stable neurovascularly intact with normal tendon function of the foot it was irrigated and cleansed safe for discharge monitor for symptoms return with concerns over-the-counter Motrin and Tylenol as needed for pain and the patient's tetanus was updated.  Of note the patient has hypertension, she was educated on the risk of hypertension causing heart disease heart attack and stroke if not controlled she will follow-up with her primary physician  [facility] [[facility]]      ED Course User Index  [RH] Carlos Russo DO         Diagnoses as of 11/18/24 2223   Superficial foreign body (sliver)     Disposition   As a result of the work-up, the patient was discharged home.  she was informed of her diagnosis and instructed to come back with any concerns or worsening of condition.  she and was agreeable to the plan as discussed above.  she was given the opportunity to  ask questions.  All of the patient's questions were answered.    Procedures   Foreign Body Removal - Embedded    Performed by: Jessika Partida DO  Authorized by: Jessika Partida DO    Consent:     Consent obtained:  Verbal    Risks discussed:  Infection, pain and incomplete removal  Universal protocol:     Patient identity confirmed:  Verbally with patient  Location:     Location:  Foot    Foot location:  L heel    Depth:  Intradermal    Tendon involvement:  None  Pre-procedure details:     Neurovascular status: intact    Procedure type:     Procedure complexity:  Simple  Procedure details:     Localization method:  Probed    Dissection of underlying tissues: no      Bloodless field: yes      Removal mechanism:  Forceps and hemostat    Foreign bodies recovered:  1    Description:  [id number] glas sliver    Intact foreign body removal: yes    Post-procedure details:     Neurovascular status: intact      Confirmation:  No additional foreign bodies on visualization    Skin closure:  None    Dressing:  Open (no dressing)    Procedure completion:  Tolerated well, no immediate complications      Patient seen and discussed with ED attending physician.    Jessika Partida DO  PGY-3 Emergency Medicine     Jessika Partida DO  Resident  11/18/24 [id number]

## 2024-11-26 ENCOUNTER — PHARMACY VISIT (OUTPATIENT)
Dept: PHARMACY | Facility: CLINIC | Age: 44
End: 2024-11-26
Payer: MEDICAID

## 2024-11-26 PROCEDURE — RXMED WILLOW AMBULATORY MEDICATION CHARGE

## 2025-01-17 ENCOUNTER — CLINICAL SUPPORT (OUTPATIENT)
Dept: OBSTETRICS AND GYNECOLOGY | Facility: CLINIC | Age: 45
End: 2025-01-17
Payer: COMMERCIAL

## 2025-01-17 ENCOUNTER — PHARMACY VISIT (OUTPATIENT)
Dept: PHARMACY | Facility: CLINIC | Age: 45
End: 2025-01-17
Payer: MEDICAID

## 2025-01-17 PROCEDURE — 2500000004 HC RX 250 GENERAL PHARMACY W/ HCPCS (ALT 636 FOR OP/ED): Mod: SE | Performed by: ADVANCED PRACTICE MIDWIFE

## 2025-01-17 PROCEDURE — RXMED WILLOW AMBULATORY MEDICATION CHARGE

## 2025-01-17 PROCEDURE — 96372 THER/PROPH/DIAG INJ SC/IM: CPT | Performed by: ADVANCED PRACTICE MIDWIFE

## 2025-01-17 RX ADMIN — MEDROXYPROGESTERONE ACETATE 150 MG: 150 INJECTION, SUSPENSION INTRAMUSCULAR at 10:38

## 2025-01-17 NOTE — PROGRESS NOTES
Patient here for Depo Provera injection  Expiration of medication 04/30/2027  Last Depo Provera: 10/23/2024  Last Annual: 08/2024  Depo Provera given IM into left gluteal region   Depo Provera given from:  office supplied  Patient tolerated well.  Patient to return for next Depo Provera injection between 04/04/2025-04/25/2025  Educated patient on condom use to prevent STDs, Inc Ca, in diet  Patient verbalized understanding and denies any further questions or concerns.

## 2025-03-25 ENCOUNTER — APPOINTMENT (OUTPATIENT)
Dept: RADIOLOGY | Facility: HOSPITAL | Age: 45
End: 2025-03-25
Payer: COMMERCIAL

## 2025-03-25 ENCOUNTER — HOSPITAL ENCOUNTER (EMERGENCY)
Facility: HOSPITAL | Age: 45
Discharge: HOME | End: 2025-03-25
Attending: EMERGENCY MEDICINE
Payer: COMMERCIAL

## 2025-03-25 VITALS
OXYGEN SATURATION: 95 % | HEART RATE: 74 BPM | DIASTOLIC BLOOD PRESSURE: 80 MMHG | TEMPERATURE: 98.4 F | RESPIRATION RATE: 16 BRPM | SYSTOLIC BLOOD PRESSURE: 173 MMHG

## 2025-03-25 DIAGNOSIS — K11.20 SIALADENITIS: Primary | ICD-10-CM

## 2025-03-25 LAB
ALBUMIN SERPL BCP-MCNC: 4.5 G/DL (ref 3.4–5)
ALP SERPL-CCNC: 63 U/L (ref 33–110)
ALT SERPL W P-5'-P-CCNC: 12 U/L (ref 7–45)
ANION GAP SERPL CALC-SCNC: 14 MMOL/L (ref 10–20)
AST SERPL W P-5'-P-CCNC: 14 U/L (ref 9–39)
BASOPHILS # BLD AUTO: 0.04 X10*3/UL (ref 0–0.1)
BASOPHILS NFR BLD AUTO: 0.5 %
BILIRUB SERPL-MCNC: 0.6 MG/DL (ref 0–1.2)
BUN SERPL-MCNC: 11 MG/DL (ref 6–23)
CALCIUM SERPL-MCNC: 9.5 MG/DL (ref 8.6–10.6)
CHLORIDE SERPL-SCNC: 105 MMOL/L (ref 98–107)
CO2 SERPL-SCNC: 22 MMOL/L (ref 21–32)
CREAT SERPL-MCNC: 0.73 MG/DL (ref 0.5–1.05)
EGFRCR SERPLBLD CKD-EPI 2021: >90 ML/MIN/1.73M*2
EOSINOPHIL # BLD AUTO: 0.18 X10*3/UL (ref 0–0.7)
EOSINOPHIL NFR BLD AUTO: 2.1 %
ERYTHROCYTE [DISTWIDTH] IN BLOOD BY AUTOMATED COUNT: 13.8 % (ref 11.5–14.5)
GLUCOSE SERPL-MCNC: 99 MG/DL (ref 74–99)
HCT VFR BLD AUTO: 43.7 % (ref 36–46)
HGB BLD-MCNC: 15.5 G/DL (ref 12–16)
IMM GRANULOCYTES # BLD AUTO: 0.03 X10*3/UL (ref 0–0.7)
IMM GRANULOCYTES NFR BLD AUTO: 0.3 % (ref 0–0.9)
LYMPHOCYTES # BLD AUTO: 2.48 X10*3/UL (ref 1.2–4.8)
LYMPHOCYTES NFR BLD AUTO: 28.3 %
MAGNESIUM SERPL-MCNC: 2.19 MG/DL (ref 1.6–2.4)
MCH RBC QN AUTO: 31.9 PG (ref 26–34)
MCHC RBC AUTO-ENTMCNC: 35.5 G/DL (ref 32–36)
MCV RBC AUTO: 90 FL (ref 80–100)
MONOCYTES # BLD AUTO: 0.71 X10*3/UL (ref 0.1–1)
MONOCYTES NFR BLD AUTO: 8.1 %
NEUTROPHILS # BLD AUTO: 5.31 X10*3/UL (ref 1.2–7.7)
NEUTROPHILS NFR BLD AUTO: 60.7 %
NRBC BLD-RTO: 0 /100 WBCS (ref 0–0)
PLATELET # BLD AUTO: 255 X10*3/UL (ref 150–450)
POTASSIUM SERPL-SCNC: 4.1 MMOL/L (ref 3.5–5.3)
PROT SERPL-MCNC: 7.8 G/DL (ref 6.4–8.2)
RBC # BLD AUTO: 4.86 X10*6/UL (ref 4–5.2)
S PYO DNA THROAT QL NAA+PROBE: NOT DETECTED
SODIUM SERPL-SCNC: 137 MMOL/L (ref 136–145)
WBC # BLD AUTO: 8.8 X10*3/UL (ref 4.4–11.3)

## 2025-03-25 PROCEDURE — 87651 STREP A DNA AMP PROBE: CPT

## 2025-03-25 PROCEDURE — 80053 COMPREHEN METABOLIC PANEL: CPT

## 2025-03-25 PROCEDURE — 2500000001 HC RX 250 WO HCPCS SELF ADMINISTERED DRUGS (ALT 637 FOR MEDICARE OP)

## 2025-03-25 PROCEDURE — 76536 US EXAM OF HEAD AND NECK: CPT

## 2025-03-25 PROCEDURE — 83735 ASSAY OF MAGNESIUM: CPT

## 2025-03-25 PROCEDURE — 2500000004 HC RX 250 GENERAL PHARMACY W/ HCPCS (ALT 636 FOR OP/ED)

## 2025-03-25 PROCEDURE — 99284 EMERGENCY DEPT VISIT MOD MDM: CPT | Performed by: EMERGENCY MEDICINE

## 2025-03-25 PROCEDURE — 36415 COLL VENOUS BLD VENIPUNCTURE: CPT

## 2025-03-25 PROCEDURE — 85025 COMPLETE CBC W/AUTO DIFF WBC: CPT

## 2025-03-25 PROCEDURE — 76536 US EXAM OF HEAD AND NECK: CPT | Performed by: RADIOLOGY

## 2025-03-25 RX ORDER — AMLODIPINE BESYLATE 10 MG/1
10 TABLET ORAL ONCE
Status: COMPLETED | OUTPATIENT
Start: 2025-03-25 | End: 2025-03-25

## 2025-03-25 RX ORDER — HYDROCHLOROTHIAZIDE 25 MG/1
25 TABLET ORAL ONCE
Status: COMPLETED | OUTPATIENT
Start: 2025-03-25 | End: 2025-03-25

## 2025-03-25 RX ORDER — ACETAMINOPHEN 325 MG/1
975 TABLET ORAL ONCE
Status: COMPLETED | OUTPATIENT
Start: 2025-03-25 | End: 2025-03-25

## 2025-03-25 RX ORDER — DEXAMETHASONE 2 MG/1
10 TABLET ORAL ONCE
Status: COMPLETED | OUTPATIENT
Start: 2025-03-25 | End: 2025-03-25

## 2025-03-25 RX ADMIN — ACETAMINOPHEN 975 MG: 325 TABLET ORAL at 14:51

## 2025-03-25 RX ADMIN — HYDROCHLOROTHIAZIDE 25 MG: 25 TABLET ORAL at 14:51

## 2025-03-25 RX ADMIN — AMLODIPINE BESYLATE 10 MG: 10 TABLET ORAL at 14:52

## 2025-03-25 RX ADMIN — DEXAMETHASONE 10 MG: 2 TABLET ORAL at 14:50

## 2025-03-25 ASSESSMENT — COLUMBIA-SUICIDE SEVERITY RATING SCALE - C-SSRS
1. IN THE PAST MONTH, HAVE YOU WISHED YOU WERE DEAD OR WISHED YOU COULD GO TO SLEEP AND NOT WAKE UP?: NO
6. HAVE YOU EVER DONE ANYTHING, STARTED TO DO ANYTHING, OR PREPARED TO DO ANYTHING TO END YOUR LIFE?: NO
2. HAVE YOU ACTUALLY HAD ANY THOUGHTS OF KILLING YOURSELF?: NO

## 2025-03-25 ASSESSMENT — LIFESTYLE VARIABLES
TOTAL SCORE: 0
EVER HAD A DRINK FIRST THING IN THE MORNING TO STEADY YOUR NERVES TO GET RID OF A HANGOVER: NO
HAVE PEOPLE ANNOYED YOU BY CRITICIZING YOUR DRINKING: NO
HAVE YOU EVER FELT YOU SHOULD CUT DOWN ON YOUR DRINKING: NO
EVER FELT BAD OR GUILTY ABOUT YOUR DRINKING: NO

## 2025-03-25 NOTE — ED PROVIDER NOTES
History of Present Illness       History provided by: Patient  Limitations to History: None  External Records Reviewed with Brief Summary: None    HPI:  MEGAN Pete is a 44-year-old female with past medical history of HTN presenting for neck swelling.  Patient states last night she had difficulty swallowing.  This morning she woke up and the left side of her neck was swollen.  Also endorsing left ear clogged sensation which also began this morning.  Patient denies fever, chills, body aches, nausea/vomiting, chest pain, abdominal pain, sore throat, cough.  Patient states she does have a lot of allergies but does not believe she was in contact with something to cause this reaction.  Patient states she did not take her blood pressure medication this morning and would like her doses here.    Physical Exam   Physical Exam  HENT:      Head:      Jaw: No trismus.      Salivary Glands: Left salivary gland is diffusely enlarged and tender.      Mouth/Throat:      Mouth: Mucous membranes are moist.      Dentition: No gingival swelling or dental abscesses.      Pharynx: Uvula midline. Posterior oropharyngeal erythema present. No pharyngeal swelling or uvula swelling.   Eyes:      Extraocular Movements: Extraocular movements intact.   Cardiovascular:      Rate and Rhythm: Normal rate and regular rhythm.   Pulmonary:      Effort: Pulmonary effort is normal.      Breath sounds: Normal breath sounds.   Abdominal:      General: Abdomen is flat.   Musculoskeletal:         General: Normal range of motion.      Cervical back: Full passive range of motion without pain and normal range of motion.   Skin:     General: Skin is warm.      Comments: No skin changes noted around the face, without erythema, warmth, signs cellulitis, rash   Neurological:      General: No focal deficit present.      Mental Status: She is alert and oriented to person, place, and time.   Psychiatric:         Mood and Affect: Mood normal.          Behavior: Behavior normal.          Triage vitals:  T 36.9 °C (98.4 °F)  HR 99  BP (!) 185/115  RR 18  O2 95 % None (Room air)    Medical Decision Making & ED Course     ED Course & [facility]       Medical Decision Making:  Medical Decision Making  Jennifer Pete is a 44-year-old female with past medical history of HTN presenting for left-sided neck swelling and left ear ache x 1 day.  Patient did not take her blood pressure medications today, she has requested her meds while here.  Patient is resting in chair comfortably.  Normal heart sounds and breath sounds bilateral with good air movement.  Left neck does appear swollen and tender to palpation.  Posterior oropharynx hard to visualize although does appear erythematous.  No signs of drooling, voice not muffled, uvula midline, normal ROM of neck.  No foreign body noted.  No abscess or lesions noted.  No skin changes noted such as erythema, warmth, rash, signs cellulitis.  Plan was to obtain CT soft tissue neck with contrast, patient noted to have an allergy to contrast, will obtain neck ultrasound.   CBC reassuring without anemia or leukocytosis.  CMP reassuring.  Strep negative.  Ultrasound of neck showing concern of sialoadenitis  Do not feel antibiotics necessary at this time as she does not have signs of infection.  Patient discharged in stable condition and has agreed to this plan.  Advised to suck on sour candy or lemon juice.  Take Tylenol as needed for pain management.  Advised to return to ED for signs of infection.  All questions answered.  ----      Differential diagnoses considered include but are not limited to: Strep, tumor/mass, OM, OE, sialoadenitis, epiglottitis, allergic reaction, dental abscess, lymphadenopathy    Independent Result Review and Interpretation: Relevant laboratory and radiographic results were reviewed and independently interpreted by myself.  As necessary, they are commented on in the ED Course.        Visit Vitals  /80    Pulse 74   Temp 36.9 °C (98.4 °F)   Resp 16   SpO2 95%   OB Status Injection   Smoking Status Some Days        Labs Reviewed   GROUP A STREPTOCOCCUS, PCR - Normal       Result Value    Group A Strep PCR Not Detected     COMPREHENSIVE METABOLIC PANEL - Normal    Glucose 99      Sodium 137      Potassium 4.1      Chloride 105      Bicarbonate 22      Anion Gap 14      Urea Nitrogen 11      Creatinine 0.73      eGFR >90      Calcium 9.5      Albumin 4.5      Alkaline Phosphatase 63      Total Protein 7.8      AST 14      Bilirubin, Total 0.6      ALT 12     MAGNESIUM - Normal    Magnesium 2.19     CBC WITH AUTO DIFFERENTIAL    WBC 8.8      nRBC 0.0      RBC 4.86      Hemoglobin 15.5      Hematocrit 43.7      MCV 90      MCH 31.9      MCHC 35.5      RDW 13.8      Platelets 255      Neutrophils % 60.7      Immature Granulocytes %, Automated 0.3      Lymphocytes % 28.3      Monocytes % 8.1      Eosinophils % 2.1      Basophils % 0.5      Neutrophils Absolute 5.31      Immature Granulocytes Absolute, Automated 0.03      Lymphocytes Absolute 2.48      Monocytes Absolute 0.71      Eosinophils Absolute 0.18      Basophils Absolute 0.04         US neck   Final Result   Asymmetrically enlarged and heterogeneous left submandibular gland   with hyperemia, correlate clinically for sialadenitis.        I personally reviewed the image(s) / study and I agree with the   findings as stated by Matteo Carey MD. This study was interpreted at   [facility], [place], [place].        MACRO:   None.        Signed by: Robe Huang 3/25/2025 5:22 PM   Dictation workstation:   [id number]          ED Course:  Diagnoses as of 03/25/25 1751   Sialadenitis     Disposition     As a result of the work-up, the patient was discharged home.  she was informed of his diagnosis and instructed to come back with any concerns or worsening of condition.  she was agreeable to the plan as discussed above.  she was given the opportunity  to ask questions.  All of the patient's questions were answered.      Procedures   Procedures    This was a shared visit with an ED attending.  The patient was seen and discussed with the ED attending    Mary Gomez PA-C  Emergency Medicine      Mary Gomez PA-C  03/25/25 3844

## 2025-03-25 NOTE — ED TRIAGE NOTES
Pt thinks she has swollen lymph node on left side only. However, poss dental pain. pt has swelling near lower left jaw causing pain and pain in left ear. Pt can swallow and handle secretions but is very aware of when she is swallowing. Denies SOB, fever, chills.     Hx of HTN, did not take meds today. Denies HA, BV, dizziness.

## 2025-03-25 NOTE — DISCHARGE INSTRUCTIONS
you were seen here for concern of facial swelling.  Ultrasound did show sialadenitis which is inflammation of the salivary gland.  At this time I recommend sucking on sour candy/lemon juice.  Return to ED for signs of infection such as fever, chills, body aches, nausea/vomiting, or if the swelling increases, pain, warmth, redness in the area.

## 2025-03-25 NOTE — Clinical Note
Jennifer Pete was seen and treated in our emergency department on 3/25/2025.  She may return to work on 03/26/2025.       If you have any questions or concerns, please don't hesitate to call.      Mary Gomez PA-C

## 2025-03-26 PROCEDURE — RXMED WILLOW AMBULATORY MEDICATION CHARGE

## 2025-03-27 ENCOUNTER — PHARMACY VISIT (OUTPATIENT)
Dept: PHARMACY | Facility: CLINIC | Age: 45
End: 2025-03-27
Payer: MEDICAID

## 2025-04-04 ENCOUNTER — APPOINTMENT (OUTPATIENT)
Dept: OBSTETRICS AND GYNECOLOGY | Facility: CLINIC | Age: 45
End: 2025-04-04

## 2025-04-07 ENCOUNTER — CLINICAL SUPPORT (OUTPATIENT)
Dept: OBSTETRICS AND GYNECOLOGY | Facility: CLINIC | Age: 45
End: 2025-04-07
Payer: COMMERCIAL

## 2025-04-07 VITALS — WEIGHT: 232.1 LBS | BODY MASS INDEX: 37.46 KG/M2

## 2025-04-07 PROCEDURE — 2500000004 HC RX 250 GENERAL PHARMACY W/ HCPCS (ALT 636 FOR OP/ED): Mod: SE | Performed by: ADVANCED PRACTICE MIDWIFE

## 2025-04-07 PROCEDURE — 96372 THER/PROPH/DIAG INJ SC/IM: CPT | Performed by: ADVANCED PRACTICE MIDWIFE

## 2025-04-07 RX ADMIN — MEDROXYPROGESTERONE ACETATE 150 MG: 150 INJECTION, SUSPENSION INTRAMUSCULAR at 15:02

## 2025-04-07 ASSESSMENT — PATIENT HEALTH QUESTIONNAIRE - PHQ9
2. FEELING DOWN, DEPRESSED OR HOPELESS: NOT AT ALL
1. LITTLE INTEREST OR PLEASURE IN DOING THINGS: NOT AT ALL
SUM OF ALL RESPONSES TO PHQ9 QUESTIONS 1 AND 2: 0

## 2025-04-07 NOTE — PROGRESS NOTES
Patient here for Depo injection  Last Depo: 01/17/2025  Last Annual: 8/2024  LPN discussed Depo-Provera side effects denied.   Depo given IM into left gluteal region. Depo supplied by office. Patient tolerated well.  Patient to RTC between 06/23/2025-07/07/2025 for depo and annual.  Patient verbalized understanding and all questions were answered.

## 2025-07-09 ENCOUNTER — PHARMACY VISIT (OUTPATIENT)
Dept: PHARMACY | Facility: CLINIC | Age: 45
End: 2025-07-09
Payer: MEDICAID

## 2025-07-09 PROCEDURE — RXMED WILLOW AMBULATORY MEDICATION CHARGE

## 2025-08-19 ENCOUNTER — PHARMACY VISIT (OUTPATIENT)
Dept: PHARMACY | Facility: CLINIC | Age: 45
End: 2025-08-19
Payer: MEDICAID

## 2025-08-19 ENCOUNTER — OFFICE VISIT (OUTPATIENT)
Dept: OBSTETRICS AND GYNECOLOGY | Facility: CLINIC | Age: 45
End: 2025-08-19
Payer: COMMERCIAL

## 2025-08-19 VITALS
BODY MASS INDEX: 37.14 KG/M2 | HEIGHT: 66 IN | HEART RATE: 93 BPM | WEIGHT: 231.1 LBS | DIASTOLIC BLOOD PRESSURE: 117 MMHG | SYSTOLIC BLOOD PRESSURE: 172 MMHG

## 2025-08-19 DIAGNOSIS — Z12.31 ENCOUNTER FOR SCREENING MAMMOGRAM FOR MALIGNANT NEOPLASM OF BREAST: ICD-10-CM

## 2025-08-19 DIAGNOSIS — Z01.419 WOMEN'S ANNUAL ROUTINE GYNECOLOGICAL EXAMINATION: Primary | ICD-10-CM

## 2025-08-19 DIAGNOSIS — J45.20 MILD INTERMITTENT ASTHMA, UNSPECIFIED WHETHER COMPLICATED (HHS-HCC): ICD-10-CM

## 2025-08-19 DIAGNOSIS — Z30.013 ENCOUNTER FOR PRESCRIPTION FOR DEPO-PROVERA: ICD-10-CM

## 2025-08-19 DIAGNOSIS — Z71.6 ENCOUNTER FOR TOBACCO USE CESSATION COUNSELING: ICD-10-CM

## 2025-08-19 DIAGNOSIS — I10 UNCONTROLLED HYPERTENSION: ICD-10-CM

## 2025-08-19 DIAGNOSIS — T63.444A BEE STING REACTION, UNDETERMINED INTENT, INITIAL ENCOUNTER: ICD-10-CM

## 2025-08-19 PROBLEM — N93.9 ABNORMAL UTERINE BLEEDING (AUB): Status: RESOLVED | Noted: 2020-09-28 | Resolved: 2025-08-19

## 2025-08-19 PROBLEM — G47.30 SLEEP APNEA: Status: RESOLVED | Noted: 2023-11-09 | Resolved: 2025-08-19

## 2025-08-19 PROBLEM — F41.9 ANXIETY: Status: RESOLVED | Noted: 2023-11-09 | Resolved: 2025-08-19

## 2025-08-19 PROBLEM — N92.1 MENOMETRORRHAGIA: Status: RESOLVED | Noted: 2020-04-02 | Resolved: 2025-08-19

## 2025-08-19 PROBLEM — D64.9 ANEMIA: Status: RESOLVED | Noted: 2023-11-09 | Resolved: 2025-08-19

## 2025-08-19 PROBLEM — E61.1 IRON DEFICIENCY: Status: RESOLVED | Noted: 2022-02-22 | Resolved: 2025-08-19

## 2025-08-19 PROBLEM — G90.511 COMPLEX REGIONAL PAIN SYNDROME TYPE 1 OF RIGHT UPPER EXTREMITY: Status: RESOLVED | Noted: 2019-09-25 | Resolved: 2025-08-19

## 2025-08-19 PROBLEM — O09.90 SUPERVISION OF HIGH-RISK PREGNANCY (HHS-HCC): Status: RESOLVED | Noted: 2023-11-09 | Resolved: 2025-08-19

## 2025-08-19 LAB — PREGNANCY TEST URINE, POC: NEGATIVE

## 2025-08-19 PROCEDURE — 99212 OFFICE O/P EST SF 10 MIN: CPT | Mod: 25

## 2025-08-19 PROCEDURE — 3080F DIAST BP >= 90 MM HG: CPT

## 2025-08-19 PROCEDURE — RXMED WILLOW AMBULATORY MEDICATION CHARGE

## 2025-08-19 PROCEDURE — 99396 PREV VISIT EST AGE 40-64: CPT

## 2025-08-19 PROCEDURE — 96372 THER/PROPH/DIAG INJ SC/IM: CPT

## 2025-08-19 PROCEDURE — 81025 URINE PREGNANCY TEST: CPT

## 2025-08-19 PROCEDURE — 99406 BEHAV CHNG SMOKING 3-10 MIN: CPT | Mod: 25,GC

## 2025-08-19 PROCEDURE — 99406 BEHAV CHNG SMOKING 3-10 MIN: CPT

## 2025-08-19 PROCEDURE — 2500000004 HC RX 250 GENERAL PHARMACY W/ HCPCS (ALT 636 FOR OP/ED): Mod: JZ,SE

## 2025-08-19 PROCEDURE — 3077F SYST BP >= 140 MM HG: CPT

## 2025-08-19 PROCEDURE — 3008F BODY MASS INDEX DOCD: CPT

## 2025-08-19 RX ORDER — CETIRIZINE HYDROCHLORIDE 10 MG/1
10 TABLET ORAL NIGHTLY
Qty: 90 TABLET | Refills: 3 | Status: SHIPPED | OUTPATIENT
Start: 2025-08-19 | End: 2026-08-19

## 2025-08-19 RX ORDER — MICONAZOLE NITRATE 2 %
2 CREAM (GRAM) TOPICAL EVERY 2 HOUR PRN
Qty: 110 EACH | Refills: 3 | Status: SHIPPED | OUTPATIENT
Start: 2025-08-19 | End: 2025-11-17

## 2025-08-19 RX ORDER — FLUTICASONE PROPIONATE AND SALMETEROL 100; 50 UG/1; UG/1
1 POWDER RESPIRATORY (INHALATION)
Qty: 60 EACH | Refills: 11 | Status: CANCELLED | OUTPATIENT
Start: 2025-08-19 | End: 2026-08-19

## 2025-08-19 RX ORDER — AMLODIPINE BESYLATE 10 MG/1
10 TABLET ORAL DAILY
Qty: 30 TABLET | Refills: 2 | Status: SHIPPED | OUTPATIENT
Start: 2025-08-19

## 2025-08-19 RX ORDER — LORATADINE 10 MG/1
10 TABLET ORAL DAILY
Qty: 20 TABLET | Refills: 0 | Status: SHIPPED | OUTPATIENT
Start: 2025-08-19 | End: 2025-09-08

## 2025-08-19 RX ORDER — ALBUTEROL SULFATE 90 UG/1
2 INHALANT RESPIRATORY (INHALATION)
Qty: 8.5 G | Refills: 3 | Status: SHIPPED | OUTPATIENT
Start: 2025-08-19

## 2025-08-19 RX ORDER — HYDROCHLOROTHIAZIDE 50 MG/1
50 TABLET ORAL DAILY
Qty: 90 TABLET | Refills: 3 | Status: SHIPPED | OUTPATIENT
Start: 2025-08-19 | End: 2026-08-19

## 2025-08-19 RX ORDER — MEDROXYPROGESTERONE ACETATE 150 MG/ML
150 INJECTION, SUSPENSION INTRAMUSCULAR
Status: SHIPPED | OUTPATIENT
Start: 2025-08-19 | End: 2026-07-21

## 2025-08-19 RX ADMIN — MEDROXYPROGESTERONE ACETATE 150 MG: 150 INJECTION, SUSPENSION INTRAMUSCULAR at 12:27

## 2025-08-19 ASSESSMENT — ENCOUNTER SYMPTOMS
HEMATOLOGIC/LYMPHATIC NEGATIVE: 0
MUSCULOSKELETAL NEGATIVE: 0
CONSTITUTIONAL NEGATIVE: 0
CARDIOVASCULAR NEGATIVE: 0
EYES NEGATIVE: 0
GASTROINTESTINAL NEGATIVE: 0
PSYCHIATRIC NEGATIVE: 0
ALLERGIC/IMMUNOLOGIC NEGATIVE: 0
RESPIRATORY NEGATIVE: 0
ENDOCRINE NEGATIVE: 0
NEUROLOGICAL NEGATIVE: 0

## 2025-08-19 ASSESSMENT — PAIN SCALES - GENERAL: PAINLEVEL_OUTOF10: 0-NO PAIN
